# Patient Record
Sex: MALE | Race: WHITE
[De-identification: names, ages, dates, MRNs, and addresses within clinical notes are randomized per-mention and may not be internally consistent; named-entity substitution may affect disease eponyms.]

---

## 2022-08-11 ENCOUNTER — OFFICE VISIT (OUTPATIENT)
Dept: INTERNAL MEDICINE CLINIC | Facility: CLINIC | Age: 37
End: 2022-08-11
Payer: COMMERCIAL

## 2022-08-11 VITALS
BODY MASS INDEX: 28.14 KG/M2 | HEIGHT: 69 IN | WEIGHT: 190 LBS | HEART RATE: 80 BPM | OXYGEN SATURATION: 98 % | DIASTOLIC BLOOD PRESSURE: 95 MMHG | SYSTOLIC BLOOD PRESSURE: 140 MMHG | TEMPERATURE: 98.4 F

## 2022-08-11 DIAGNOSIS — R79.89 ELEVATED SERUM CREATININE: ICD-10-CM

## 2022-08-11 DIAGNOSIS — Z76.89 ENCOUNTER TO ESTABLISH CARE: ICD-10-CM

## 2022-08-11 DIAGNOSIS — R31.9 HEMATURIA, UNSPECIFIED TYPE: ICD-10-CM

## 2022-08-11 DIAGNOSIS — L03.116 LEFT LEG CELLULITIS: Primary | ICD-10-CM

## 2022-08-11 DIAGNOSIS — R17 ELEVATED BILIRUBIN: ICD-10-CM

## 2022-08-11 DIAGNOSIS — L03.116 LEFT LEG CELLULITIS: ICD-10-CM

## 2022-08-11 LAB
ALBUMIN SERPL-MCNC: 4.3 G/DL (ref 3.5–5)
ALBUMIN/GLOB SERPL: 1.1 {RATIO} (ref 1.2–3.5)
ALP SERPL-CCNC: 78 U/L (ref 50–136)
ALT SERPL-CCNC: 57 U/L (ref 12–65)
ANION GAP SERPL CALC-SCNC: 4 MMOL/L (ref 7–16)
APPEARANCE UR: CLEAR
AST SERPL-CCNC: 17 U/L (ref 15–37)
BASOPHILS # BLD: 0.1 K/UL (ref 0–0.2)
BASOPHILS NFR BLD: 1 % (ref 0–2)
BILIRUB SERPL-MCNC: 1.2 MG/DL (ref 0.2–1.1)
BILIRUB UR QL: NEGATIVE
BUN SERPL-MCNC: 14 MG/DL (ref 6–23)
CALCIUM SERPL-MCNC: 9.5 MG/DL (ref 8.3–10.4)
CHLORIDE SERPL-SCNC: 104 MMOL/L (ref 98–107)
CO2 SERPL-SCNC: 29 MMOL/L (ref 21–32)
COLOR UR: NORMAL
CREAT SERPL-MCNC: 1.2 MG/DL (ref 0.8–1.5)
DIFFERENTIAL METHOD BLD: NORMAL
EOSINOPHIL # BLD: 0.1 K/UL (ref 0–0.8)
EOSINOPHIL NFR BLD: 1 % (ref 0.5–7.8)
ERYTHROCYTE [DISTWIDTH] IN BLOOD BY AUTOMATED COUNT: 12.1 % (ref 11.9–14.6)
GLOBULIN SER CALC-MCNC: 3.8 G/DL (ref 2.3–3.5)
GLUCOSE SERPL-MCNC: 92 MG/DL (ref 65–100)
GLUCOSE UR STRIP.AUTO-MCNC: NEGATIVE MG/DL
HCT VFR BLD AUTO: 43.3 % (ref 41.1–50.3)
HGB BLD-MCNC: 14.6 G/DL (ref 13.6–17.2)
HGB UR QL STRIP: NEGATIVE
IMM GRANULOCYTES # BLD AUTO: 0.1 K/UL (ref 0–0.5)
IMM GRANULOCYTES NFR BLD AUTO: 1 % (ref 0–5)
KETONES UR QL STRIP.AUTO: NEGATIVE MG/DL
LEUKOCYTE ESTERASE UR QL STRIP.AUTO: NEGATIVE
LYMPHOCYTES # BLD: 1.6 K/UL (ref 0.5–4.6)
LYMPHOCYTES NFR BLD: 21 % (ref 13–44)
MCH RBC QN AUTO: 30.4 PG (ref 26.1–32.9)
MCHC RBC AUTO-ENTMCNC: 33.7 G/DL (ref 31.4–35)
MCV RBC AUTO: 90.2 FL (ref 79.6–97.8)
MONOCYTES # BLD: 0.6 K/UL (ref 0.1–1.3)
MONOCYTES NFR BLD: 8 % (ref 4–12)
NEUTS SEG # BLD: 5.4 K/UL (ref 1.7–8.2)
NEUTS SEG NFR BLD: 68 % (ref 43–78)
NITRITE UR QL STRIP.AUTO: NEGATIVE
NRBC # BLD: 0 K/UL (ref 0–0.2)
PH UR STRIP: 5.5 [PH] (ref 5–9)
PLATELET # BLD AUTO: 213 K/UL (ref 150–450)
PMV BLD AUTO: 9.9 FL (ref 9.4–12.3)
POTASSIUM SERPL-SCNC: 4.2 MMOL/L (ref 3.5–5.1)
PROT SERPL-MCNC: 8.1 G/DL (ref 6.3–8.2)
PROT UR STRIP-MCNC: NEGATIVE MG/DL
RBC # BLD AUTO: 4.8 M/UL (ref 4.23–5.6)
SODIUM SERPL-SCNC: 137 MMOL/L (ref 138–145)
SP GR UR REFRACTOMETRY: 1.02 (ref 1–1.02)
UROBILINOGEN UR QL STRIP.AUTO: 0.2 EU/DL (ref 0.2–1)
WBC # BLD AUTO: 7.9 K/UL (ref 4.3–11.1)

## 2022-08-11 PROCEDURE — 99204 OFFICE O/P NEW MOD 45 MIN: CPT | Performed by: NURSE PRACTITIONER

## 2022-08-11 RX ORDER — DOXYCYCLINE HYCLATE 100 MG
TABLET ORAL
COMMUNITY
Start: 2022-08-03

## 2022-08-11 RX ORDER — CEPHALEXIN 500 MG/1
CAPSULE ORAL
COMMUNITY
Start: 2022-08-04

## 2022-08-11 RX ORDER — SULFAMETHOXAZOLE AND TRIMETHOPRIM 800; 160 MG/1; MG/1
1 TABLET ORAL 2 TIMES DAILY
Qty: 20 TABLET | Refills: 0 | Status: SHIPPED | OUTPATIENT
Start: 2022-08-11 | End: 2022-08-21

## 2022-08-11 ASSESSMENT — ENCOUNTER SYMPTOMS
SORE THROAT: 0
DIARRHEA: 0
BACK PAIN: 0
RHINORRHEA: 0
EYE PAIN: 0
VOMITING: 0
SHORTNESS OF BREATH: 0
NAUSEA: 0
SINUS PAIN: 0
ABDOMINAL PAIN: 0
CONSTIPATION: 0
COUGH: 0

## 2022-08-11 NOTE — PROGRESS NOTES
[address]  Tel# [phone]  Fax# [phone]       Izabel Negron, LORI-BC  Family Nurse Practitioner            Date of Visit: 2022     Nel Weir (: 1985) is a 43 y.o. male  new patient, here for evaluation of the following chief complaint(s):    Chief Complaint   Patient presents with    Established New Doctor      [facility]   [facility] was [facility]         Patient Care Team:  SILVIA Roberts - CNP as PCP - General (Nurse Practitioner Family)         History of Present Illness        New Patient, Same Day  Presents here as a new patient with spouse and wants to establish care. Denies having a PCP. Relocated from [place] [id number] last 2022. Denies having a PCP in [place]. ER follow up  Presents here with complaint of unrelieved left leg swelling. Was seen at the ER on 8/3/2022, diagnosed with cellulitis. States he first came to an urgent care and was told to go to the ER that day. Reviewed ER note from [address] [place] [address] Rd Urgent Care Visit on 8/3/2022. States he has one more dose left of his Keflex today and finished Doxycycline oral. Pt states he is not ready to go back to work today. Still has left leg swelling. ER note work excuse until 8/10/2022. States he had 2 rounds of Vancomycin IV at the ER. Patient Active Problem List   Diagnosis    Elevated serum creatinine    Elevated bilirubin    Left leg cellulitis    Hematuria         History reviewed. No pertinent past medical history. History reviewed. No pertinent surgical history.     Family History   Problem Relation Age of Onset    No Known Problems Mother     No Known Problems Father          ALLERGY  No Known Allergies        Current Outpatient Medications on File Prior to Visit   Medication Sig Dispense Refill    cephALEXin (KEFLEX) 500 MG capsule PLEASE SEE ATTACHED FOR DETAILED DIRECTIONS (Patient not taking: Reported on 8/11/2022)      doxycycline hyclate (VIBRA-TABS) 100 MG tablet TAKE 1 TABLET BY MOUTH TWICE A DAY (Patient not taking: Reported on 8/11/2022)       No current facility-administered medications on file prior to visit. Review of Systems  Review of Systems   Constitutional:  Negative for chills, fatigue and fever. HENT:  Negative for congestion, postnasal drip, rhinorrhea, sinus pain, sneezing and sore throat. Eyes:  Negative for pain and visual disturbance. Respiratory:  Negative for cough and shortness of breath. Cardiovascular:  Negative for chest pain, palpitations and leg swelling. Gastrointestinal:  Negative for abdominal pain, constipation, diarrhea, nausea and vomiting. Genitourinary:  Negative for dysuria, frequency and urgency. Musculoskeletal:  Negative for back pain, gait problem and joint swelling. Skin:  Positive for rash and wound. Left lower leg and left foot swelling, redness   Neurological:  Negative for dizziness and headaches. Psychiatric/Behavioral:  Negative for behavioral problems, sleep disturbance and suicidal ideas. The patient is not nervous/anxious. Vitals:    08/11/22 1449 08/11/22 1546   BP: (!) 133/97 (!) 140/95   Pulse: 80    Temp: 98.4 °F (36.9 °C)    TempSrc: Temporal    SpO2: 98%    Weight: 190 lb (86.2 kg)    Height: 5' 9\" (1.753 m)               Physical Exam  Physical Exam  Constitutional:       General: He is not in acute distress. Appearance: He is not ill-appearing. HENT:      Head: Normocephalic and atraumatic. Eyes:      Pupils: Pupils are equal, round, and reactive to light. Cardiovascular:      Rate and Rhythm: Normal rate and regular rhythm. Pulmonary:      Effort: Pulmonary effort is normal. No respiratory distress. Breath sounds: Normal breath sounds. Abdominal:      General: Bowel sounds are normal.      Palpations: Abdomen is soft.    Musculoskeletal:         General: Swelling (left lower leg, left foot) present. Cervical back: Neck supple. Skin:     General: Skin is warm and dry. Findings: Erythema present. Comments: Left lower leg erythematous, warm to touch, medial aspect with dried scab lesion, left lateral aspect with one circular scab lesion. Neurological:      General: No focal deficit present. Mental Status: He is alert and oriented to person, place, and time. Psychiatric:         Mood and Affect: Mood normal.         Thought Content: Thought content normal.              Assessment/Plan:          ICD-10-CM    1. Left leg cellulitis  L03.116 CBC with Auto Differential     sulfamethoxazole-trimethoprim (BACTRIM DS;SEPTRA DS) 800-160 MG per tablet      2. Encounter to establish care  Z76.89       3. Elevated bilirubin  R17 Comprehensive Metabolic Panel      4. Elevated serum creatinine  R79.89 Comprehensive Metabolic Panel      5. Hematuria, unspecified type  R31.9 Urinalysis               Coby Haney was seen today for established new doctor. Diagnoses and all orders for this visit:    Left leg cellulitis  -     CBC with Auto Differential; Future  -     sulfamethoxazole-trimethoprim (BACTRIM DS;SEPTRA DS) 800-160 MG per tablet; Take 1 tablet by mouth in the morning and 1 tablet before bedtime. Do all this for 10 days. Encounter to establish care    Elevated bilirubin  -     Comprehensive Metabolic Panel; Future    Elevated serum creatinine  -     Comprehensive Metabolic Panel; Future    Hematuria, unspecified type  -     Urinalysis; Future       Keep leg and wound clean and dry. Monitor and report any worsening symptoms to ER/hospital.  Given return to work excuse 8/11 - 814/2022.         Orders Placed This Encounter   Procedures    CBC with Auto Differential     Standing Status:   Future     Number of Occurrences:   1     Standing Expiration Date:   8/11/2023    Comprehensive Metabolic Panel     Standing Status:   Future     Number of Occurrences:   1     Standing Expiration Date:   11/11/2022    Urinalysis     Standing Status:   Future     Number of Occurrences:   1     Standing Expiration Date:   8/11/2023                    Follow Up  Return in about 2 weeks (around 8/25/2022), or if symptoms worsen or fail to improve.              Cort Lundborg, DNP, FNP-BC

## 2022-08-12 ENCOUNTER — TELEPHONE (OUTPATIENT)
Dept: INTERNAL MEDICINE CLINIC | Facility: CLINIC | Age: 37
End: 2022-08-12

## 2022-08-12 NOTE — TELEPHONE ENCOUNTER
----- Message from SILVIA Putnam - CNP sent at 8/12/2022  8:11 AM EDT -----  Labs improved. Sodium one point low, may have slight increase intake of table salt or intake or Gatorade/Powerade. Bilirubin down to 1.2 (normal 0.2-1.1)  Creatinine level normalized. UA (urine) - negative for blood (hematuria).

## 2022-08-12 NOTE — TELEPHONE ENCOUNTER
----- Message from SILVIA Obrien - CNP sent at 8/12/2022  8:11 AM EDT -----  Labs improved. Sodium one point low, may have slight increase intake of table salt or intake or Gatorade/Powerade. Bilirubin down to 1.2 (normal 0.2-1.1)  Creatinine level normalized. UA (urine) - negative for blood (hematuria).

## 2022-08-15 ENCOUNTER — TELEPHONE (OUTPATIENT)
Dept: INTERNAL MEDICINE CLINIC | Facility: CLINIC | Age: 37
End: 2022-08-15

## 2022-08-17 ENCOUNTER — TELEPHONE (OUTPATIENT)
Dept: INTERNAL MEDICINE CLINIC | Facility: CLINIC | Age: 37
End: 2022-08-17

## 2022-08-17 NOTE — TELEPHONE ENCOUNTER
Pt called stating that he needs to have FMLA paperwork filled out and will drop it off tomorrow. Informed him appt would be required he stated that he told  at the office visit about him being out on FMLA and the paperwork and wasn't told about an appt being needed. Please contact patient back.

## 2022-08-18 ENCOUNTER — TELEPHONE (OUTPATIENT)
Dept: INTERNAL MEDICINE CLINIC | Facility: CLINIC | Age: 37
End: 2022-08-18

## 2022-08-18 NOTE — TELEPHONE ENCOUNTER
Pt brought in \"Work Ability/Return to [employer]" forms for [place] to fill out/sign. I put them in the white binder up front for Janet Pop to grab. Pt states the forms can be faxed to the fax number on the forms when they are complete.

## 2022-08-22 ENCOUNTER — OFFICE VISIT (OUTPATIENT)
Dept: INTERNAL MEDICINE CLINIC | Facility: CLINIC | Age: 37
End: 2022-08-22
Payer: COMMERCIAL

## 2022-08-22 VITALS
BODY MASS INDEX: 30.66 KG/M2 | SYSTOLIC BLOOD PRESSURE: 133 MMHG | WEIGHT: 207 LBS | RESPIRATION RATE: 15 BRPM | OXYGEN SATURATION: 98 % | HEIGHT: 69 IN | TEMPERATURE: 98.2 F | DIASTOLIC BLOOD PRESSURE: 92 MMHG | HEART RATE: 90 BPM

## 2022-08-22 DIAGNOSIS — L03.116 LEFT LEG CELLULITIS: Primary | ICD-10-CM

## 2022-08-22 DIAGNOSIS — R03.0 ELEVATED BP WITHOUT DIAGNOSIS OF HYPERTENSION: ICD-10-CM

## 2022-08-22 DIAGNOSIS — R17 ELEVATED BILIRUBIN: ICD-10-CM

## 2022-08-22 PROBLEM — R79.89 ELEVATED SERUM CREATININE: Status: RESOLVED | Noted: 2022-08-11 | Resolved: 2022-08-22

## 2022-08-22 PROBLEM — R31.9 HEMATURIA: Status: RESOLVED | Noted: 2022-08-11 | Resolved: 2022-08-22

## 2022-08-22 PROCEDURE — 99213 OFFICE O/P EST LOW 20 MIN: CPT | Performed by: NURSE PRACTITIONER

## 2022-08-22 RX ORDER — SULFAMETHOXAZOLE AND TRIMETHOPRIM 800; 160 MG/1; MG/1
1 TABLET ORAL 2 TIMES DAILY
COMMUNITY

## 2022-08-22 ASSESSMENT — ENCOUNTER SYMPTOMS
BACK PAIN: 0
SINUS PAIN: 0
RHINORRHEA: 0
SHORTNESS OF BREATH: 0
NAUSEA: 0
ABDOMINAL PAIN: 0
CONSTIPATION: 0
DIARRHEA: 0
COUGH: 0
SORE THROAT: 0
VOMITING: 0
EYE PAIN: 0

## 2022-08-22 ASSESSMENT — PATIENT HEALTH QUESTIONNAIRE - PHQ9
SUM OF ALL RESPONSES TO PHQ QUESTIONS 1-9: 0
1. LITTLE INTEREST OR PLEASURE IN DOING THINGS: 0
SUM OF ALL RESPONSES TO PHQ9 QUESTIONS 1 & 2: 0
SUM OF ALL RESPONSES TO PHQ QUESTIONS 1-9: 0
SUM OF ALL RESPONSES TO PHQ QUESTIONS 1-9: 0
2. FEELING DOWN, DEPRESSED OR HOPELESS: 0
SUM OF ALL RESPONSES TO PHQ QUESTIONS 1-9: 0

## 2022-08-22 NOTE — PROGRESS NOTES
[facility]  [address] [place] [address]  Tel# [phone]  Fax# [phone]       Izabel Smith, [facility]-BC  Family Nurse Practitioner            Date of Visit: 2022     Amparo Jackson (: 1985) is a 43 y.o. male established patient, here for evaluation of the following chief complaint(s):    Chief Complaint   Patient presents with    Cellulitis     Left leg - 2 week follow up         Patient Care Team:  SILVIA Richards CNP as PCP - General (Nurse Practitioner Family)  SILVIA Richards CNP as PCP - [facility] Empaneled Provider         History of Present Illness      Left leg cellulitis follow up  Presents here for follow up on left leg cellulitis. Pt states he picked the Bactrim the next day, seen the improvement around 5th day, less left leg pain on walking and swelling and redness decreased. States the still missed work this past Thursday and . Finished antibiotic last night. See FMLA form. States he received his FMLA form last Thursday afternoon. Elevated BP  Pt states when he comes to doctor's office, his blood pressure is always elevated or high. Lab Results   Component Value Date    WBC 7.9 2022    HGB 14.6 2022    HCT 43.3 2022    MCV 90.2 2022     2022        Lab Results   Component Value Date     (L) 2022    K 4.2 2022     2022    CO2 29 2022    BUN 14 2022    CREATININE 1.20 2022    GLUCOSE 92 2022    CALCIUM 9.5 2022    PROT 8.1 2022    LABALBU 4.3 2022    BILITOT 1.2 (H) 2022    ALKPHOS 78 2022    AST 17 2022    ALT 57 2022    LABGLOM >60 2022    GFRAA >60 2022    GLOB 3.8 (H) 2022      No results found for: TSHFT4, TSH, TSHREFLEX, [id number]     UA normal 2022.         Patient Active Problem List   Diagnosis    Elevated bilirubin    Left leg cellulitis    Elevated BP without diagnosis of hypertension Past Medical History:   Diagnosis Date    Elevated serum creatinine 8/11/2022       History reviewed. No pertinent surgical history. Family History   Problem Relation Age of Onset    No Known Problems Mother     No Known Problems Father          ALLERGY  No Known Allergies        Current Outpatient Medications on File Prior to Visit   Medication Sig Dispense Refill    sulfamethoxazole-trimethoprim (BACTRIM DS;SEPTRA DS) 800-160 MG per tablet Take 1 tablet by mouth 2 times daily      cephALEXin (KEFLEX) 500 MG capsule PLEASE SEE ATTACHED FOR DETAILED DIRECTIONS (Patient not taking: No sig reported)      doxycycline hyclate (VIBRA-TABS) 100 MG tablet TAKE 1 TABLET BY MOUTH TWICE A DAY (Patient not taking: No sig reported)       No current facility-administered medications on file prior to visit. Review of Systems  Review of Systems   Constitutional:  Negative for chills, fatigue and fever. HENT:  Negative for congestion, postnasal drip, rhinorrhea, sinus pain, sneezing and sore throat. Eyes:  Negative for pain and visual disturbance. Respiratory:  Negative for cough and shortness of breath. Cardiovascular:  Negative for chest pain, palpitations and leg swelling. Gastrointestinal:  Negative for abdominal pain, constipation, diarrhea, nausea and vomiting. Genitourinary:  Negative for dysuria, frequency and urgency. Musculoskeletal:  Negative for back pain, gait problem and joint swelling. Skin:  Positive for wound (left leg swelling, cellulitis). Negative for rash. Neurological:  Negative for dizziness and headaches. Psychiatric/Behavioral:  Negative for behavioral problems, sleep disturbance and suicidal ideas. The patient is not nervous/anxious.                Vitals:    08/22/22 1453   BP: (!) 133/92   Site: Right Upper Arm   Position: Sitting   Cuff Size: Large Adult   Pulse: 90   Resp: 15   Temp: 98.2 °F (36.8 °C)   TempSrc: Temporal   SpO2: 98%   Weight: 207 lb (93.9 kg) Height: 5' 8.74\" (1.746 m)              Physical Exam  Physical Exam  Constitutional:       General: He is not in acute distress. Appearance: He is not ill-appearing. HENT:      Head: Normocephalic and atraumatic. Eyes:      Pupils: Pupils are equal, round, and reactive to light. Cardiovascular:      Rate and Rhythm: Normal rate and regular rhythm. Pulmonary:      Effort: Pulmonary effort is normal. No respiratory distress. Breath sounds: Normal breath sounds. Abdominal:      General: Bowel sounds are normal.      Palpations: Abdomen is soft. Musculoskeletal:         General: Normal range of motion. Cervical back: Neck supple. Skin:     General: Skin is warm and dry. Comments: Left lower leg erythema resolving, scattered skin scabs to left lower leg and ankle noted, edema resolving   Neurological:      General: No focal deficit present. Mental Status: He is alert and oriented to person, place, and time. Psychiatric:         Mood and Affect: Mood normal.         Thought Content: Thought content normal.              Assessment/Plan:          ICD-10-CM    1. Left leg cellulitis  L03.116       2. Elevated BP without diagnosis of hypertension  R03.0       3. Elevated bilirubin  R17                Jed Gomez was seen today for cellulitis. Diagnoses and all orders for this visit:    Left leg cellulitis    Elevated BP without diagnosis of hypertension    Elevated bilirubin       See FMLA form (to be faxed). Advised to continue to monitor left lower leg. Avoid scratching. Continue foods high in protein and foods high in Vitamin C. Advised on low salt diet. Advised to avoid processed foods such as fast foods, canned foods. Advised to read food labels. Advised to limit stress or find ways to reduce stress. Advised on physical activity or exercise 3-5 days a week, for at least 30 minutes, as tolerated.  Advised to seek immediate medical attention (call 911 or present to Emergency Dept) for any chest pains, palpitations or shortness of breath. Follow Up  Return in about 3 months (around 11/22/2022), or if symptoms worsen or fail to improve, for Physical with labs.              Rae Tobar, ANNE, FNP-BC